# Patient Record
(demographics unavailable — no encounter records)

---

## 2024-10-18 NOTE — ASSESSMENT
[FreeTextEntry1] : 1.  Subclinical hypothyroidism 2.  Neck pain Mildly elevated TSH 8.1 from 9/2024, with normal free T4. Musculoskeletal neck pain, wrist swelling. RECOMMENDATIONS: -We discussed usual management of subclinical hypothyroidism, recommend repeating TFTs and thyroid antibodies after stopping Neutrafol/biotin for 3 to 4 days -We discussed usual indications for treatment of subclinical hypothyroidism, such as persistent elevated TSH greater than 10, young females desiring fertility or actively pregnant, and as a trial for management of symptoms consistent with hypothyroidism. -Since she will be potentially planning pregnancy through IVF, and she is complaining of multiple nonspecific symptoms including fatigue, if her labs are again consistent with subclinical hypothyroidism, we will consider starting levothyroxine to normalize TSH and potentially improve her symptoms -Unlikely that she has subacute thyroiditis, no tenderness in her thyroid.  Can check thyroid ultrasound, prescription provided. -Unlikely that musculoskeletal neck pain, joint swellings especially the left wrist are related to her subclinical hypothyroidism.  Recommend continuing follow-up with her rheumatologist, she will send us records, she is undergoing workup currently.  RTC in 6 months.  May get interim repeat labs depending on next week's lab results.  Ford Kinsey MD [facility] at [address], [address] Ph: [phone] Fax: [phone]

## 2024-10-18 NOTE — PHYSICAL EXAM
[TextEntry] : PHYSICAL EXAMINATION: Vital signs from today's encounter reviewed.  GENERAL: No acute distress, clinically eukinetic, normal appearance HEAD: Normocephalic, atraumatic EYES: conjunctivae are pink and moist, no icterus, no proptosis  NECK: thyroid is not enlarged/nodular on palpation, non-tender, no adenopathy CARDIOVASCULAR: well-perfused extremities, no peripheral edema RESPIRATORY: normal chest expansion with good pulmonary effort, no acute respiratory distress MUSCULOSKELETAL: Muscle stiffness in the lateral sides of the neck and painful mobility, normal gait SKIN: no pallor, no icterus, no rash  NEUROLOGIC: alert and oriented, no evident focal deficits, no tremors  PSYCHIATRIC: mood and affect are normal ENDOCRINE: No obvious stigmata of Cushing's or acromegaly present

## 2024-10-18 NOTE — HISTORY OF PRESENT ILLNESS
[FreeTextEntry1] : CHIEF COMPLAINT: Subclinical hypothyroidism/thyroiditis REFERRED BY: Dr. Stephanie Vora   HISTORY OF PRESENTING ILLNESS: The patient is a 38-year-old female being evaluated for abnormal thyroid function/subclinical hypothyroidism.  She reports that labs from 7/2024 showed normal thyroid function test. Labs from 9/2024 done with GYN showed TSH 8.1 and free T4 1.3.  Labs scanned in chart. Denies any prior knowledge of having thyroid disease, has never taken thyroid medications. No family history of thyroid issues or thyroid cancer. No history of radiation exposure to her head and neck area.  She does not take any prescription medications. She takes Neutra fall supplement, also takes ashwagandha, cinnamon, chlorella and rosina root.   She reports that for the last few months she has been having irregular periods.  Reports musculoskeletal pain on the sides of her neck and the back of her neck, decreased mobility in her neck as it is painful when moving her neck.  She has received epidural injection and trigger point injections for this, without much relief.  Recently, also noticed swelling in her left wrist, and pain/inflammation on the left side of her back/side.  Following with a rheumatologist, was told that she may have Hashimoto's disease or Psoriatic arthritis.  She denies palpitations or tremors, denies constipation or diarrhea, denies nausea or vomiting.  Endorses some heat intolerance when sleeping.  Endorses mood swings and insomnia.  Notices fatigue/decreased energy levels.  Endorses skin discoloration and hair thinning.  No neck swelling, no shortness of breath, no voice changes.  Endorses dysphagia with food and recent snoring. No recent weight gain or weight loss.  No prior knowledge of having thyroid nodules/thyroiditis.  Reproductive history: Recently endorses irregular periods.  She does not have biological kids, desires pregnancy in future.  Reports that she has been working with a fertility specialist, was diagnosed with unexplained infertility.  She has undergone 3 cycles of IUI and 1 cycle of IVF in 2022, but unsuccessful.  Would like to try IVF again.  Social history: She works as an  for a dentist office.